# Patient Record
Sex: FEMALE | Race: WHITE | NOT HISPANIC OR LATINO | Employment: UNEMPLOYED | URBAN - METROPOLITAN AREA
[De-identification: names, ages, dates, MRNs, and addresses within clinical notes are randomized per-mention and may not be internally consistent; named-entity substitution may affect disease eponyms.]

---
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
## 2025-01-29 ENCOUNTER — OFFICE VISIT (OUTPATIENT)
Dept: URGENT CARE | Facility: CLINIC | Age: 20
End: 2025-01-29
Payer: COMMERCIAL

## 2025-01-29 VITALS
BODY MASS INDEX: 25.92 KG/M2 | DIASTOLIC BLOOD PRESSURE: 86 MMHG | WEIGHT: 161.3 LBS | RESPIRATION RATE: 18 BRPM | OXYGEN SATURATION: 98 % | HEART RATE: 94 BPM | SYSTOLIC BLOOD PRESSURE: 129 MMHG | TEMPERATURE: 98.6 F | HEIGHT: 66 IN

## 2025-01-29 DIAGNOSIS — J20.9 ACUTE BRONCHITIS, UNSPECIFIED ORGANISM: Primary | ICD-10-CM

## 2025-01-29 PROCEDURE — 99213 OFFICE O/P EST LOW 20 MIN: CPT

## 2025-01-29 RX ORDER — AZITHROMYCIN 250 MG/1
TABLET, FILM COATED ORAL
Qty: 6 TABLET | Refills: 0 | Status: SHIPPED | OUTPATIENT
Start: 2025-01-29 | End: 2025-02-02

## 2025-01-29 RX ORDER — GUAIFENESIN 600 MG/1
1200 TABLET, EXTENDED RELEASE ORAL EVERY 12 HOURS SCHEDULED
Qty: 60 TABLET | Refills: 0 | Status: SHIPPED | OUTPATIENT
Start: 2025-01-29 | End: 2025-02-13

## 2025-01-29 RX ORDER — BROMPHENIRAMINE MALEATE, PSEUDOEPHEDRINE HYDROCHLORIDE, AND DEXTROMETHORPHAN HYDROBROMIDE 2; 30; 10 MG/5ML; MG/5ML; MG/5ML
10 SYRUP ORAL 4 TIMES DAILY PRN
Qty: 240 ML | Refills: 0 | Status: SHIPPED | OUTPATIENT
Start: 2025-01-29

## 2025-01-29 NOTE — PATIENT INSTRUCTIONS
Take the antibiotics each day as ordered until completion    Take the cough and congestion medicine up to 4 times per day as needed and directed. DO NOT take additional cough suppressants or decongestants while taking this medicine    Follow up with family dr if not better in 5 days.     If worsening symptoms and you are having breathing difficulty you are advised to seek care with the emergency room

## 2025-01-29 NOTE — PROGRESS NOTES
[facility]        NAME: Josi Nolasco is a 19 y.o. female  : 2005    MRN: [id number]  DATE: 2025  TIME: 8:53 AM    Assessment and Plan   Acute bronchitis, unspecified organism [J20.9]  1. Acute bronchitis, unspecified organism  azithromycin (ZITHROMAX) 250 mg tablet    brompheniramine-pseudoephedrine-DM 30-2-10 MG/5ML syrup    guaiFENesin (MUCINEX) 600 mg 12 hr tablet        Instructed patient to stop taking augmentin as her sinus cavities are without tenderness. She has no headaches. She has sinus congestion and rhinorrhea. She is coughing and it is a frequent congested cough. Occasionally productive for thick green sputum. Pt has had cough x5 days. Last week she had cold symptoms and experienced the sweats/chills. She denies fever within past 48 hours. She denies activity or appetite disturbances. Advised pt to take OTC guaifenesin 1200mg BID if her insurance does not cover the prescription sent to the pharmacy. Will place on z-jericho and bromfed for cough to be used sparingly when needed (reserve specifically for bedtime but may take up to 4 times per day if needed - precautions discussed). Follow up with pcp if not improving in 3-5 days with new treatment regimen. If worsening and any breathing difficulty advised to seek care with the ER.     Patient Instructions       Follow up with PCP in 3-5 days.  Proceed to  ER if symptoms worsen.    If tests are performed, our office will contact you with results only if changes need to made to the care plan discussed with you at the visit. You can review your full results on [facility].    Chief Complaint     Chief Complaint   Patient presents with    Cold Like Symptoms     Patient c/o cough, stuffy nose, and the sweats that started over a week ago.  Per patient, she is being treated for sinus and ear infection starting by [facility] starting on 25 with Augmentin.  Patient reports sinus pain and pressure two days prior to calling  [facility].           History of Present Illness       19 year old female presents to this clinic with complaint of cough, stuffy nose, and the sweats that started over a week ago.  Per patient, she is being treated for sinus and ear infection starting by [facility] starting on 1/17/25 with Augmentin.  Patient reports nasal congestion x2 days prior to calling [facility] and receiving antibiotics. Tody, pt reports incessant coughing which is keeping her awake at night. She is not taking any OTC medicines to alleviate her symptoms. She denies fever or chills, activity or appetite disturbance.         Review of Systems   Review of Systems   Constitutional:  Negative for activity change, appetite change, chills, fatigue and fever.   HENT:  Positive for congestion and rhinorrhea. Negative for ear pain and sore throat.    Respiratory:  Positive for cough and chest tightness.          Current Medications       Current Outpatient Medications:     azithromycin (ZITHROMAX) 250 mg tablet, Take 2 tablets today then 1 tablet daily x 4 days, Disp: 6 tablet, Rfl: 0    brompheniramine-pseudoephedrine-DM 30-2-10 MG/5ML syrup, Take 10 mL by mouth 4 (four) times a day as needed for congestion or cough, Disp: 240 mL, Rfl: 0    guaiFENesin (MUCINEX) 600 mg 12 hr tablet, Take 2 tablets (1,200 mg total) by mouth every 12 (twelve) hours for 15 days, Disp: 60 tablet, Rfl: 0    norethindrone-ethinyl estradiol-ferrous fumarate (Natalie 24 Fe) 1-20 MG-MCG(24) per tablet, TAKE 1 TABLET DAILY, Disp: 84 tablet, Rfl: 0    Current Allergies     Allergies as of 01/29/2025 - Reviewed 01/29/2025   Allergen Reaction Noted    Keflex [cephalexin] Vomiting 01/29/2025            The following portions of the patient's history were reviewed and updated as appropriate: allergies, current medications, past family history, past medical history, past social history, past surgical history and problem list.     Past Medical History:   Diagnosis Date    Teratoma of ovary,  "right 2020    s/p resection       Past Surgical History:   Procedure Laterality Date    OVARIAN CYST REMOVAL Right 2020    mature teratoma       Family History   Problem Relation Age of Onset    No Known Problems Mother     No Known Problems Father          Medications have been verified.        Objective   /86   Pulse 94   Temp 98.6 °F (37 °C) (Temporal)   Resp 18   Ht 5' 6\" (1.676 m)   Wt 73.2 kg (161 lb 4.8 oz)   SpO2 98%   BMI 26.03 kg/m²        Physical Exam     Physical Exam  Vitals and nursing note reviewed.   Constitutional:       Appearance: Normal appearance. She is normal weight.   HENT:      Head: Normocephalic and atraumatic.      Right Ear: Tympanic membrane, ear canal and external ear normal.      Left Ear: Tympanic membrane, ear canal and external ear normal.      Nose: Congestion and rhinorrhea present. Rhinorrhea is clear.      Right Turbinates: Enlarged.      Left Turbinates: Enlarged.      Right Sinus: No maxillary sinus tenderness or frontal sinus tenderness.      Left Sinus: No maxillary sinus tenderness or frontal sinus tenderness.      Mouth/Throat:      Lips: Pink.      Mouth: Mucous membranes are moist.      Pharynx: Uvula midline. Posterior oropharyngeal erythema present. No pharyngeal swelling, uvula swelling or postnasal drip.      Tonsils: No tonsillar exudate.   Eyes:      Extraocular Movements: Extraocular movements intact.      Conjunctiva/sclera: Conjunctivae normal.      Pupils: Pupils are equal, round, and reactive to light.   Cardiovascular:      Rate and Rhythm: Normal rate and regular rhythm.      Pulses: Normal pulses.      Heart sounds: Normal heart sounds.   Pulmonary:      Effort: Pulmonary effort is normal.      Breath sounds: Normal breath sounds.      Comments: Frequent dry/congested cough  Abdominal:      General: Bowel sounds are normal.      Palpations: Abdomen is soft.   Musculoskeletal:         General: Normal range of motion.      Cervical back: Normal " range of motion and neck supple. No rigidity.   Lymphadenopathy:      Cervical: No cervical adenopathy.   Skin:     General: Skin is warm and dry.      Capillary Refill: Capillary refill takes less than 2 seconds.   Neurological:      General: No focal deficit present.      Mental Status: She is alert.

## 2025-05-26 ENCOUNTER — OFFICE VISIT (OUTPATIENT)
Dept: URGENT CARE | Facility: CLINIC | Age: 20
End: 2025-05-26
Payer: COMMERCIAL

## 2025-05-26 VITALS
BODY MASS INDEX: 25.11 KG/M2 | TEMPERATURE: 98.2 F | HEART RATE: 90 BPM | SYSTOLIC BLOOD PRESSURE: 122 MMHG | WEIGHT: 155.6 LBS | OXYGEN SATURATION: 97 % | RESPIRATION RATE: 20 BRPM | DIASTOLIC BLOOD PRESSURE: 72 MMHG

## 2025-05-26 DIAGNOSIS — J02.9 SORE THROAT: Primary | ICD-10-CM

## 2025-05-26 LAB — S PYO AG THROAT QL: NEGATIVE

## 2025-05-26 PROCEDURE — 99213 OFFICE O/P EST LOW 20 MIN: CPT

## 2025-05-26 PROCEDURE — 87070 CULTURE OTHR SPECIMN AEROBIC: CPT

## 2025-05-26 PROCEDURE — 87880 STREP A ASSAY W/OPTIC: CPT

## 2025-05-26 RX ORDER — PENICILLIN V POTASSIUM 500 MG/1
500 TABLET, FILM COATED ORAL EVERY 8 HOURS SCHEDULED
Qty: 30 TABLET | Refills: 0 | Status: SHIPPED | OUTPATIENT
Start: 2025-05-26 | End: 2025-06-05

## 2025-05-26 NOTE — PATIENT INSTRUCTIONS
Take antibiotics for 10 days as prescribed.  Use OTC ibuprofen/tylenol as needed for symptomatic relief.  Replace toothbrush after 24 hours on antibiotic.  May return to school/ after 24 hours fever free without ibuprofen/tylenol.

## 2025-05-26 NOTE — PROGRESS NOTES
[facility]  Name: Josi Nolasco      : 2005      MRN: [id number]  Encounter Provider: Kody Trejo PA-C  Encounter Date: 2025   Encounter department: [facility] [place]  :  Assessment & Plan  Sore throat    Orders:    POCT rapid strepA    Throat culture; Future    penicillin V potassium (VEETID) 500 mg tablet; Take 1 tablet (500 mg total) by mouth every 8 (eight) hours for 10 days    Rapid strep negative, throat culture pending    Discussed with patient that although rapid strep was negative, currently displaying 3/4 Centor criteria.  Will send out for throat culture and initiate antibiotic therapy.  Advised patient that if she is beginning to have increasing difficulty swallowing or breathing to present to ER for further evaluation and management.      Patient Instructions  Take antibiotics for 10 days as prescribed.  Use OTC ibuprofen/tylenol as needed for symptomatic relief.  Replace toothbrush after 24 hours on antibiotic.  May return to school/ after 24 hours fever free without ibuprofen/tylenol.     Follow up with PCP in 3-5 days.  Proceed to  ER if symptoms worsen.    If tests are performed, our office will contact you with results only if changes need to made to the care plan discussed with you at the visit. You can review your full results on [facility].    Chief Complaint:   Chief Complaint   Patient presents with    Sore Throat     Body aches    Fatigue     History of Present Illness   20-year-old female presenting with 2 days of sore throat, body aches, fatigue, chills, nausea.  Patient currently denying fever, cough, vomiting/diarrhea.  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  Patient has been taking ibuprofen and Tylenol at home for her aches.          Review of Systems   Constitutional:  Positive for chills and fatigue. Negative for fever.   HENT:  Positive for sore throat. Negative for congestion, ear pain and  rhinorrhea.    Eyes:  Negative for pain and visual disturbance.   Respiratory:  Negative for cough and shortness of breath.    Cardiovascular:  Negative for chest pain and palpitations.   Gastrointestinal:  Positive for nausea. Negative for abdominal pain, diarrhea and vomiting.   Genitourinary:  Negative for dysuria and hematuria.   Musculoskeletal:  Positive for myalgias. Negative for arthralgias and back pain.   Skin:  Negative for color change and rash.   Neurological:  Negative for seizures and syncope.   All other systems reviewed and are negative.    Past Medical History   Past Medical History[1]  Past Surgical History[2]  Family History[3]  she reports that she has never smoked. She has never used smokeless tobacco. She reports that she does not drink alcohol and does not use drugs.  Current Outpatient Medications   Medication Instructions    brompheniramine-pseudoephedrine-DM 30-2-10 MG/5ML syrup 10 mL, Oral, 4 times daily PRN    norethindrone-ethinyl estradiol-ferrous fumarate (Natalie 24 Fe) 1-20 MG-MCG(24) per tablet 1 tablet, Oral, Daily    penicillin V potassium (VEETID) 500 mg, Oral, Every 8 hours scheduled   Allergies[4]     Objective   /72   Pulse 90   Temp 98.2 °F (36.8 °C)   Resp 20   Wt 70.6 kg (155 lb 9.6 oz)   SpO2 97%   BMI 25.11 kg/m²      Physical Exam  Vitals and nursing note reviewed.   Constitutional:       General: She is not in acute distress.     Appearance: She is well-developed.   HENT:      Head: Normocephalic and atraumatic.      Mouth/Throat:      Mouth: Mucous membranes are moist.      Pharynx: Oropharynx is clear. Uvula midline. Posterior oropharyngeal erythema present. No oropharyngeal exudate or uvula swelling.      Tonsils: Tonsillar exudate present. No tonsillar abscesses. 1+ on the right. 1+ on the left.     Eyes:      Conjunctiva/sclera: Conjunctivae normal.       Cardiovascular:      Rate and Rhythm: Normal rate and regular rhythm.      Heart sounds: Normal  "heart sounds. No murmur heard.  Pulmonary:      Effort: Pulmonary effort is normal. No respiratory distress.      Breath sounds: Normal breath sounds.   Abdominal:      Palpations: Abdomen is soft.      Tenderness: There is no abdominal tenderness.     Musculoskeletal:         General: No swelling.      Cervical back: Neck supple.   Lymphadenopathy:      Cervical: Cervical adenopathy present.     Skin:     General: Skin is warm and dry.      Capillary Refill: Capillary refill takes less than 2 seconds.     Neurological:      Mental Status: She is alert.     Psychiatric:         Mood and Affect: Mood normal.         Portions of the record may have been created with voice recognition software.  Occasional wrong word or \"sound a like\" substitutions may have occurred due to the inherent limitations of voice recognition software.  Read the chart carefully and recognize, using context, where substitutions have occurred.       [1]   Past Medical History:  Diagnosis Date    Teratoma of ovary, right 2020    s/p resection   [2]   Past Surgical History:  Procedure Laterality Date    OVARIAN CYST REMOVAL Right 2020    mature teratoma   [3]   Family History  Problem Relation Name Age of Onset    No Known Problems Mother      No Known Problems Father     [4]   Allergies  Allergen Reactions    Keflex [Cephalexin] Vomiting     "

## 2025-05-29 LAB — BACTERIA THROAT CULT: NORMAL
